# Patient Record
Sex: MALE | HISPANIC OR LATINO | Employment: FULL TIME | URBAN - METROPOLITAN AREA
[De-identification: names, ages, dates, MRNs, and addresses within clinical notes are randomized per-mention and may not be internally consistent; named-entity substitution may affect disease eponyms.]

---

## 2018-03-02 ENCOUNTER — OFFICE VISIT (OUTPATIENT)
Dept: MEDICAL GROUP | Facility: PHYSICIAN GROUP | Age: 33
End: 2018-03-02
Payer: COMMERCIAL

## 2018-03-02 VITALS
WEIGHT: 252 LBS | SYSTOLIC BLOOD PRESSURE: 130 MMHG | HEART RATE: 82 BPM | DIASTOLIC BLOOD PRESSURE: 92 MMHG | TEMPERATURE: 98.7 F | HEIGHT: 75 IN | BODY MASS INDEX: 31.33 KG/M2 | OXYGEN SATURATION: 95 %

## 2018-03-02 DIAGNOSIS — F32.1 MODERATE SINGLE CURRENT EPISODE OF MAJOR DEPRESSIVE DISORDER (HCC): ICD-10-CM

## 2018-03-02 DIAGNOSIS — R58 ECCHYMOSIS: ICD-10-CM

## 2018-03-02 DIAGNOSIS — E66.9 OBESITY (BMI 30-39.9): ICD-10-CM

## 2018-03-02 DIAGNOSIS — Z13.220 SCREENING FOR LIPID DISORDERS: ICD-10-CM

## 2018-03-02 DIAGNOSIS — R35.89 POLYURIA: ICD-10-CM

## 2018-03-02 DIAGNOSIS — F41.1 GENERALIZED ANXIETY DISORDER: ICD-10-CM

## 2018-03-02 DIAGNOSIS — R20.0 NUMBNESS AND TINGLING OF UPPER AND LOWER EXTREMITIES OF BOTH SIDES: ICD-10-CM

## 2018-03-02 DIAGNOSIS — R03.0 ELEVATED BLOOD PRESSURE READING: ICD-10-CM

## 2018-03-02 DIAGNOSIS — R20.2 NUMBNESS AND TINGLING OF UPPER AND LOWER EXTREMITIES OF BOTH SIDES: ICD-10-CM

## 2018-03-02 DIAGNOSIS — R53.83 FATIGUE, UNSPECIFIED TYPE: ICD-10-CM

## 2018-03-02 PROBLEM — F32.9 MAJOR DEPRESSIVE DISORDER: Status: ACTIVE | Noted: 2018-03-02

## 2018-03-02 PROCEDURE — 99204 OFFICE O/P NEW MOD 45 MIN: CPT | Performed by: PHYSICIAN ASSISTANT

## 2018-03-02 ASSESSMENT — PATIENT HEALTH QUESTIONNAIRE - PHQ9
CLINICAL INTERPRETATION OF PHQ2 SCORE: 6
5. POOR APPETITE OR OVEREATING: 0 - NOT AT ALL
SUM OF ALL RESPONSES TO PHQ QUESTIONS 1-9: 17

## 2018-03-03 NOTE — PROGRESS NOTES
Mauricio Todd is a 32 y.o. male here for anxiety, depression, fatigue, excessive urination, tingling, bruise on foot. Is a new patient to me and [facility] and is also establishing care today.    Previous PCP: None    HPI:      Patient presents to the office today with multiple different concerns. He has not had a primary care provider any time in the recent past. Patient states that he is worried about diabetes. Has been having ongoing excessive urination and nocturia, often 4-5 times per night. He endorses significant fatigue during the day and recently developed tingling in his arms and legs which he states comes and goes. Currently feels some tingling of his right lower leg, but it sometimes will involve the other leg or one or both of his arms--there is no rhyme or reason to the distribution in his opinion. He also is worried about a bruise on his right foot that he noticed a week ago while on a business trip. He is concerned because he doesn't recall injuring his foot at all. He does endorse very poor diet and lack of exercise.    He has a very high stress job as a . He states that the last 6 months, he feels that he hasn't been able to control his emotions. Has very high anxiety during the day. Finds himself often hyperventilating due to perceived inability to take deep breath when he is under stress. Is able to function well at work, but is having difficulty with his home life. His relationship with his wife and 3 children has been strained. He feels it is difficult to communicate with his family and friends because he is so focused on his job. He is often tearful and depressed on a daily basis. He has difficulty concentrating, endorses being very fidgety and restless. He denies any suicidal ideation or thoughts of self-harm. In the past, has smoked marijuana to self treat his symptoms but does not want to continue to do this.    He also states that he has had problems with high blood  "pressure readings in the past. Has not been checking his blood pressures anytime recently. Has never been on medication for his blood pressure before.    Current medicines (including changes today)  No current outpatient prescriptions on file.     No current facility-administered medications for this visit.      He  has a past medical history of GERD (gastroesophageal reflux disease).  He  has a past surgical history that includes other ().  Social History   Substance Use Topics   • Smoking status: Former Smoker     Packs/day: 1.00     Years: 5.00     Types: Cigarettes     Quit date: 3/2/2011   • Smokeless tobacco: Never Used   • Alcohol use Yes      Comment: occasional - 1-2 drinks/month or less     Social History     Social History Narrative   • No narrative on file     Family History   Problem Relation Age of Onset   • Cancer Mother    • No Known Problems Father    • No Known Problems Brother    • Diabetes Maternal Grandmother    • Stroke Maternal Grandfather    • No Known Problems Paternal Grandmother    • No Known Problems Brother      Family Status   Relation Status   • Mother Alive   • Father Alive   • Brother Alive   • Maternal Grandmother    • Maternal Grandfather Alive   • Paternal Grandmother    • Paternal Grandfather Alive   • Brother Alive         ROS  Positive ROS as per HPI  All other systems reviewed and are negative       Objective:     Blood pressure 130/92, pulse 82, temperature 37.1 °C (98.7 °F), height 1.905 m (6' 3\"), weight 114.3 kg (252 lb), SpO2 95 %. Body mass index is 31.5 kg/m².  Physical Exam:    Constitutional: Alert, no distress.  Psychiatric: Patient is fully oriented with fluent speech. He does appear very anxious and restless and becomes tearful at multiple times throughout our visit.  Skin: Warm, dry, good turgor, no rashes in visible areas. There is an area of ecchymosis present on the right dorsal foot.  Eye: Equal, round and reactive, conjunctiva clear, lids " normal.  ENMT: Lips without lesions, good dentition, oropharynx clear.  Neck: Trachea midline, no masses, no thyromegaly. No submandibular or anterior cervical lymphadenopathy.  Respiratory: Unlabored respiratory effort, lungs clear to auscultation, no wheezes, no ronchi.  Cardiovascular: Normal S1, S2, no murmur, no edema.  Abdomen: Soft, non-tender, no masses, no hepatosplenomegaly.  Psych: Alert and oriented x3, normal affect and mood.        Assessment and Plan:   The following treatment plan was discussed    1. Moderate single current episode of major depressive disorder (CMS-HCC)  2. Generalized anxiety disorder  PHQ-9 score today is 17. WINSOME-7 score is 21. Given degree of functional impairment, recommend treatment with SSRI and outpatient behavioral counseling. He is willing to see therapist, but adamantly declines antidepressants as he does not believe in them. May be willing to go on medication strictly for anxiety, but states he would like to do research first.  - REFERRAL TO PSYCHOLOGY  - Patient has been identified as being depressed and appropriate orders and counseling have been given    3. Fatigue, unspecified type  Multiple different etiologies as discussed with patient. Recommend basic screening lab panel to assess for anemia, electrolyte derangement, kidney/liver dysfunction, diabetes, thyroid abnormality.   - CBC WITH DIFFERENTIAL; Future  - COMP METABOLIC PANEL; Future  - HEMOGLOBIN A1C; Future  - TSH WITH REFLEX TO FT4; Future    4. Polyuria  Will screen for diabetes with lab work.  - COMP METABOLIC PANEL; Future  - HEMOGLOBIN A1C; Future    5. Numbness and tingling of upper and lower extremities of both sides  Will start by obtaining above lab panel. Multiple possible etiologies. Anxiety is possibly contributing. Explained that hyperventilation can cause tingling. Patient advised to try and pinpoint any triggers for the tingling going forward. Will reassess at next office visit.    6.  Ecchymosis  Patient reassured that isolated bruise is not cause for concern. Likely injured foot and does not remember precipitating event.     7. Elevated blood pressure reading  BP today in office is 130/92 which I explained his elevated. Per patient, has had elevations in the past as well. Would like him to start consistently monitoring his BP at home and write down in log that I have provided him with. Should bring to next office visit in 4 weeks for review. If persistent elevation, will recommend that he start anti-hypertensive medication.    8. Obesity (BMI 30-39.9)  Patient counseled on importance of diet and exercise to help with weight loss. Explained that exercise is also a good way to manage stress and anxiety.  - Patient identified as having weight management issue.  Appropriate orders and counseling given.    9. Screening for lipid disorders  - LIPID PROFILE; Future      Followup: Return in about 4 weeks (around 3/30/2018) for f/u BP, tingling, lab results; Short.    Vania Moore P.A.-C.

## 2018-03-06 ENCOUNTER — HOSPITAL ENCOUNTER (OUTPATIENT)
Dept: LAB | Facility: MEDICAL CENTER | Age: 33
End: 2018-03-06
Attending: PHYSICIAN ASSISTANT
Payer: COMMERCIAL

## 2018-03-06 DIAGNOSIS — R35.89 POLYURIA: ICD-10-CM

## 2018-03-06 DIAGNOSIS — R53.83 FATIGUE, UNSPECIFIED TYPE: ICD-10-CM

## 2018-03-06 DIAGNOSIS — Z13.220 SCREENING FOR LIPID DISORDERS: ICD-10-CM

## 2018-03-06 LAB
ALBUMIN SERPL BCP-MCNC: 4.1 G/DL (ref 3.2–4.9)
ALBUMIN/GLOB SERPL: 1.5 G/DL
ALP SERPL-CCNC: 64 U/L (ref 30–99)
ALT SERPL-CCNC: 18 U/L (ref 2–50)
ANION GAP SERPL CALC-SCNC: 7 MMOL/L (ref 0–11.9)
AST SERPL-CCNC: 20 U/L (ref 12–45)
BASOPHILS # BLD AUTO: 0.8 % (ref 0–1.8)
BASOPHILS # BLD: 0.05 K/UL (ref 0–0.12)
BILIRUB SERPL-MCNC: 0.6 MG/DL (ref 0.1–1.5)
BUN SERPL-MCNC: 17 MG/DL (ref 8–22)
CALCIUM SERPL-MCNC: 9 MG/DL (ref 8.5–10.5)
CHLORIDE SERPL-SCNC: 109 MMOL/L (ref 96–112)
CHOLEST SERPL-MCNC: 228 MG/DL (ref 100–199)
CO2 SERPL-SCNC: 21 MMOL/L (ref 20–33)
CREAT SERPL-MCNC: 1.02 MG/DL (ref 0.5–1.4)
EOSINOPHIL # BLD AUTO: 0.12 K/UL (ref 0–0.51)
EOSINOPHIL NFR BLD: 1.9 % (ref 0–6.9)
ERYTHROCYTE [DISTWIDTH] IN BLOOD BY AUTOMATED COUNT: 38.8 FL (ref 35.9–50)
EST. AVERAGE GLUCOSE BLD GHB EST-MCNC: 105 MG/DL
GLOBULIN SER CALC-MCNC: 2.7 G/DL (ref 1.9–3.5)
GLUCOSE SERPL-MCNC: 92 MG/DL (ref 65–99)
HBA1C MFR BLD: 5.3 % (ref 0–5.6)
HCT VFR BLD AUTO: 48.9 % (ref 42–52)
HDLC SERPL-MCNC: 36 MG/DL
HGB BLD-MCNC: 16.7 G/DL (ref 14–18)
IMM GRANULOCYTES # BLD AUTO: 0.01 K/UL (ref 0–0.11)
IMM GRANULOCYTES NFR BLD AUTO: 0.2 % (ref 0–0.9)
LDLC SERPL CALC-MCNC: 157 MG/DL
LYMPHOCYTES # BLD AUTO: 1.83 K/UL (ref 1–4.8)
LYMPHOCYTES NFR BLD: 28.5 % (ref 22–41)
MCH RBC QN AUTO: 27.8 PG (ref 27–33)
MCHC RBC AUTO-ENTMCNC: 34.2 G/DL (ref 33.7–35.3)
MCV RBC AUTO: 81.5 FL (ref 81.4–97.8)
MONOCYTES # BLD AUTO: 0.43 K/UL (ref 0–0.85)
MONOCYTES NFR BLD AUTO: 6.7 % (ref 0–13.4)
NEUTROPHILS # BLD AUTO: 3.98 K/UL (ref 1.82–7.42)
NEUTROPHILS NFR BLD: 61.9 % (ref 44–72)
NRBC # BLD AUTO: 0 K/UL
NRBC BLD-RTO: 0 /100 WBC
PLATELET # BLD AUTO: 238 K/UL (ref 164–446)
PMV BLD AUTO: 10 FL (ref 9–12.9)
POTASSIUM SERPL-SCNC: 3.9 MMOL/L (ref 3.6–5.5)
PROT SERPL-MCNC: 6.8 G/DL (ref 6–8.2)
RBC # BLD AUTO: 6 M/UL (ref 4.7–6.1)
SODIUM SERPL-SCNC: 137 MMOL/L (ref 135–145)
TRIGL SERPL-MCNC: 177 MG/DL (ref 0–149)
TSH SERPL DL<=0.005 MIU/L-ACNC: 1.03 UIU/ML (ref 0.38–5.33)
WBC # BLD AUTO: 6.4 K/UL (ref 4.8–10.8)

## 2018-03-06 PROCEDURE — 85025 COMPLETE CBC W/AUTO DIFF WBC: CPT

## 2018-03-06 PROCEDURE — 84443 ASSAY THYROID STIM HORMONE: CPT

## 2018-03-06 PROCEDURE — 83036 HEMOGLOBIN GLYCOSYLATED A1C: CPT

## 2018-03-06 PROCEDURE — 80053 COMPREHEN METABOLIC PANEL: CPT

## 2018-03-06 PROCEDURE — 36415 COLL VENOUS BLD VENIPUNCTURE: CPT

## 2018-03-06 PROCEDURE — 80061 LIPID PANEL: CPT

## 2018-03-27 ENCOUNTER — APPOINTMENT (OUTPATIENT)
Dept: BEHAVIORAL HEALTH | Facility: PHYSICIAN GROUP | Age: 33
End: 2018-03-27
Payer: COMMERCIAL

## 2018-03-29 ENCOUNTER — TELEPHONE (OUTPATIENT)
Dept: MEDICAL GROUP | Facility: PHYSICIAN GROUP | Age: 33
End: 2018-03-29

## 2018-03-29 NOTE — TELEPHONE ENCOUNTER
Future Appointments       Provider Department Center    3/30/2018 10:05 AM Vania Moore P.A.-C. [facility]    4/4/2018 7:00 AM Maurisio Sharp III, Ed.D. [facility]        ESTABLISHED PATIENT PRE-VISIT PLANNING     Note: Patient will not be contacted if there is no indication to call.     1.  Reviewed notes from the last few office visits within the medical group: Yes 03/02/2018    2.  If any orders were placed at last visit or intended to be done for this visit (i.e. 6 mos follow-up), do we have Results/Consult Notes?        •  Labs - Labs ordered, completed on 03/06/2018 and results are in chart.       •  Imaging - Imaging was not ordered at last office visit.       •  Referrals - Referral ordered, patient has NOT been seen. scheduled     3. Is this appointment scheduled as a Hospital Follow-Up? No    4.  Immunizations were updated in Epic using WebIZ?: Yes       •  Web Iz Recommendations: FLU and TDAP    5.  Patient is due for the following Health Maintenance Topics:   Health Maintenance Due   Topic Date Due   • IMM DTaP/Tdap/Td Vaccine (1 - Tdap) 10/24/2004   • IMM INFLUENZA (1) 09/01/2017       6.  MDX printed for Provider? NO INS chap [employer] and [employer]    7.  Patient was informed to arrive 15 min prior to their scheduled appointment and bring in their medication bottles. Confirmed through automated call

## 2018-03-30 ENCOUNTER — OFFICE VISIT (OUTPATIENT)
Dept: MEDICAL GROUP | Facility: PHYSICIAN GROUP | Age: 33
End: 2018-03-30
Payer: COMMERCIAL

## 2018-03-30 VITALS
DIASTOLIC BLOOD PRESSURE: 82 MMHG | WEIGHT: 250 LBS | BODY MASS INDEX: 31.08 KG/M2 | OXYGEN SATURATION: 96 % | TEMPERATURE: 98.9 F | SYSTOLIC BLOOD PRESSURE: 110 MMHG | HEART RATE: 70 BPM | HEIGHT: 75 IN

## 2018-03-30 DIAGNOSIS — F32.1 MODERATE SINGLE CURRENT EPISODE OF MAJOR DEPRESSIVE DISORDER (HCC): ICD-10-CM

## 2018-03-30 DIAGNOSIS — F41.1 GENERALIZED ANXIETY DISORDER: ICD-10-CM

## 2018-03-30 DIAGNOSIS — E78.2 MIXED HYPERLIPIDEMIA: ICD-10-CM

## 2018-03-30 PROBLEM — R20.0 NUMBNESS AND TINGLING OF UPPER AND LOWER EXTREMITIES OF BOTH SIDES: Status: RESOLVED | Noted: 2018-03-02 | Resolved: 2018-03-30

## 2018-03-30 PROBLEM — R20.2 NUMBNESS AND TINGLING OF UPPER AND LOWER EXTREMITIES OF BOTH SIDES: Status: RESOLVED | Noted: 2018-03-02 | Resolved: 2018-03-30

## 2018-03-30 PROCEDURE — 99213 OFFICE O/P EST LOW 20 MIN: CPT | Performed by: PHYSICIAN ASSISTANT

## 2018-03-30 NOTE — PROGRESS NOTES
Subjective:   Mauricio Todd is a 32 y.o. male here today for hyperlipidemia, review of blood pressure and lab results, anxiety/depression follow-up. Is an established patient of mine.    HPI:    Patient presents to the office today for follow-up on several different issues. The last time I saw him, he had slight elevation of his blood pressure and I had wanted him to start monitoring at home. He states that he unfortunately has not been doing this. He does not have any blood pressure readings to give me today. Blood pressure today in the office is noted to be improved at 110/82. He did recently have lab work done which showed elevation of cholesterol. When he saw these results, he states that he became very serious about improving his lifestyle which hasn't been the best as of late. He has been eating much better. Has cut out soda and fast food and decrease consumption of red meat. He is eating more salads and vegetables. He also has started running on a regular basis and has already lost 7 pounds since our last visit.    The last time I saw him, he had also been having a lot of issues with anxiety and depression. He feels that his symptoms have been slightly better since starting regular exercise, but not a significant improvement. He did schedule the psychology appointment that I had recommended which is upcoming next week. He continues to prefer not to take any type of psychiatric medication for his symptoms. He states that he does not like pills and if his symptoms can be controlled by behavioral therapy, he would much prefer that.    Some of the issues that we had discussed last time included some intermittent tingling in his arms and legs that has completely resolved. He also was really worried about a bruise on his foot which also has resolved.      Current medicines (including changes today)  No current outpatient prescriptions on file.     No current facility-administered medications for this visit.   "    He  has a past medical history of GERD (gastroesophageal reflux disease).    ROS  Constitutional ROS: Positive for fatigue - attributes to anxiety/depression  Neurologic ROS: No numbness, No tingling  Psychiatric ROS: Positive for anxiety and depression       Objective:     Blood pressure 110/82, pulse 70, temperature 37.2 °C (98.9 °F), height 1.905 m (6' 3\"), weight 113.4 kg (250 lb), SpO2 96 %. Body mass index is 31.25 kg/m².     Physical Exam:  Constitutional: Alert, well-appearing, no distress.  Psychiatric: Fully oriented, fluent speech. Well-dressed and well-groomed. Pleasant and conversational. Affect is appropriate with euthymic mood.  Skin: No rashes in visible areas.  Eye: Conjunctiva clear, lids normal.  ENMT: Lips without lesions, moist mucus membranes.      Assessment and Plan:   The following treatment plan was discussed    1. Mixed hyperlipidemia  New problem. Had discussion with patient today regarding lifestyle modification to improve cholesterol levels. He much prefers this approach to taking medication. Recommend repeat lipid panel in 3 months.  - LIPID PROFILE; Future    2. Generalized anxiety disorder  Established problem, still uncontrolled. Recommend following up for behavioral therapy as scheduled.Continue exercise, which I explained has positive benefits for mental health.    3. Moderate single current episode of major depressive disorder (CMS-HCC)  Established problem, still uncontrolled. Recommend following up for behavioral therapy as scheduled. Continue exercise, which I explained has positive benefits for mental health.      Followup: Return in about 3 months (around 6/30/2018).    Vania Moore P.A.-C.             "

## 2018-03-30 NOTE — PATIENT INSTRUCTIONS
"DASH Eating Plan  DASH stands for \"Dietary Approaches to Stop Hypertension.\" The DASH eating plan is a healthy eating plan that has been shown to reduce high blood pressure (hypertension). Additional health benefits may include reducing the risk of type 2 diabetes mellitus, heart disease, and stroke. The DASH eating plan may also help with weight loss.  What do I need to know about the DASH eating plan?  For the DASH eating plan, you will follow these general guidelines:  · Choose foods with less than 150 milligrams of sodium per serving (as listed on the food label).  · Use salt-free seasonings or herbs instead of table salt or sea salt.  · Check with your health care provider or pharmacist before using salt substitutes.  · Eat lower-sodium products. These are often labeled as \"low-sodium\" or \"no salt added.\"  · Eat fresh foods. Avoid eating a lot of canned foods.  · Eat more vegetables, fruits, and low-fat dairy products.  · Choose whole grains. Look for the word \"whole\" as the first word in the ingredient list.  · Choose fish and skinless chicken or turkey more often than red meat. Limit fish, poultry, and meat to 6 oz (170 g) each day.  · Limit sweets, desserts, sugars, and sugary drinks.  · Choose heart-healthy fats.  · Eat more home-cooked food and less restaurant, buffet, and fast food.  · Limit fried foods.  · Do not squires foods. Cook foods using methods such as baking, boiling, grilling, and broiling instead.  · When eating at a restaurant, ask that your food be prepared with less salt, or no salt if possible.  What foods can I eat?  Seek help from a dietitian for individual calorie needs.  Grains   Whole grain or whole wheat bread. Brown rice. Whole grain or whole wheat pasta. Quinoa, bulgur, and whole grain cereals. Low-sodium cereals. Corn or whole wheat flour tortillas. Whole grain cornbread. Whole grain crackers. Low-sodium crackers.  Vegetables   Fresh or frozen vegetables (raw, steamed, roasted, or " grilled). Low-sodium or reduced-sodium tomato and vegetable juices. Low-sodium or reduced-sodium tomato sauce and paste. Low-sodium or reduced-sodium canned vegetables.  Fruits   All fresh, canned (in natural juice), or frozen fruits.  Meat and Other Protein Products   Ground beef (85% or leaner), grass-fed beef, or beef trimmed of fat. Skinless chicken or turkey. Ground chicken or turkey. Pork trimmed of fat. All fish and seafood. Eggs. Dried beans, peas, or lentils. Unsalted nuts and seeds. Unsalted canned beans.  Dairy   Low-fat dairy products, such as skim or 1% milk, 2% or reduced-fat cheeses, low-fat ricotta or cottage cheese, or plain low-fat yogurt. Low-sodium or reduced-sodium cheeses.  Fats and Oils   Tub margarines without trans fats. Light or reduced-fat mayonnaise and salad dressings (reduced sodium). Avocado. Safflower, olive, or canola oils. Natural peanut or almond butter.  Other   Unsalted popcorn and pretzels.  The items listed above may not be a complete list of recommended foods or beverages. Contact your dietitian for more options.   What foods are not recommended?  Grains   White bread. White pasta. White rice. Refined cornbread. Bagels and croissants. Crackers that contain trans fat.  Vegetables   Creamed or fried vegetables. Vegetables in a cheese sauce. Regular canned vegetables. Regular canned tomato sauce and paste. Regular tomato and vegetable juices.  Fruits   Canned fruit in light or heavy syrup. Fruit juice.  Meat and Other Protein Products   Fatty cuts of meat. Ribs, chicken wings, grubbs, sausage, bologna, salami, chitterlings, fatback, hot dogs, bratwurst, and packaged luncheon meats. Salted nuts and seeds. Canned beans with salt.  Dairy   Whole or 2% milk, cream, half-and-half, and cream cheese. Whole-fat or sweetened yogurt. Full-fat cheeses or blue cheese. Nondairy creamers and whipped toppings. Processed cheese, cheese spreads, or cheese curds.  Condiments   Onion and garlic  salt, seasoned salt, table salt, and sea salt. Canned and packaged gravies. Worcestershire sauce. Tartar sauce. Barbecue sauce. Teriyaki sauce. Soy sauce, including reduced sodium. Steak sauce. Fish sauce. Oyster sauce. Cocktail sauce. Horseradish. Ketchup and mustard. Meat flavorings and tenderizers. Bouillon cubes. Hot sauce. Tabasco sauce. Marinades. Taco seasonings. Relishes.  Fats and Oils   Butter, stick margarine, lard, shortening, ghee, and grubbs fat. Coconut, palm kernel, or palm oils. Regular salad dressings.  Other   Pickles and olives. Salted popcorn and pretzels.  The items listed above may not be a complete list of foods and beverages to avoid. Contact your dietitian for more information.   Where can I find more information?  National [employer], [employer], [employer] [place]: www.nhlbi.nih.gov/health/health-topics/topics/dash/  This information is not intended to replace advice given to you by your health care provider. Make sure you discuss any questions you have with your health care provider.  Document Released: 12/06/2012 Document Revised: 05/25/2017 Document Reviewed: 10/22/2014  Elsevier Interactive Patient Education © 2017 [employer] Inc[employer]

## 2018-06-29 ENCOUNTER — TELEPHONE (OUTPATIENT)
Dept: MEDICAL GROUP | Facility: PHYSICIAN GROUP | Age: 33
End: 2018-06-29

## 2024-03-28 NOTE — TELEPHONE ENCOUNTER
Future Appointments       Provider Department Center    7/2/2018 8:05 AM Vania Moore P.A.-C. [facility]        ESTABLISHED PATIENT PRE-VISIT PLANNING     Note: Patient will not be contacted if there is no indication to call.     1.  Reviewed notes from the last few office visits within the medical group: Yes    2.  If any orders were placed at last visit or intended to be done for this visit (i.e. 6 mos follow-up), do we have Results/Consult Notes?        •  Labs - Labs ordered, NOT completed. Patient advised to complete prior to next appointment.       •  Imaging - Imaging was not ordered at last office visit.       •  Referrals - Referral ordered, patient has NOT been seen. scheduled     3. Is this appointment scheduled as a Hospital Follow-Up? No    4.  Immunizations were updated in Epic using WebIZ?: No WebIZ record       •  Web Iz Recommendations: TDAP    5.  Patient is due for the following Health Maintenance Topics:   Health Maintenance Due   Topic Date Due   • IMM DTaP/Tdap/Td Vaccine (1 - Tdap) 10/24/2004       6.  MDX printed for Provider? NO INS BCBS     7.  Patient was informed to arrive 15 min prior to their scheduled appointment and bring in their medication bottles.   Max hanna MD